# Patient Record
(demographics unavailable — no encounter records)

---

## 2025-04-19 NOTE — DISCUSSION/SUMMARY
[de-identified] : X-Ray Examination of the LUMBAR SPINE 2 views shows: disc space narrowing  Assessment:   The patient is a 48 year old male with physical exam findings consistent with lumbar radiculopathy and DDD  - We discussed their diagnosis and treatment options at length including the risks and benefits of both surgical and non-surgical options. - We will continue conservative treatment with anti-inflammatory medication. - Rx given for Medrol dose pack and Cyclobenzaprine - Discussed the possible side effects of medication along with the timing and frequency for taking. - Discussed IR to pain management   Follow Up: as needed with me

## 2025-04-19 NOTE — IMAGING
[de-identified] : Back / Spine: Inspection: No erythema and ecchymosis. Palpation: left lumbar paraspinal tenderness. Range of motion: Near full range of motion but with mild stiffness. Pain with lateral rotation. pain at extremes of flexion Strength Testing: Weakness with left ankle dorsiflexion and EHL strength Otherwise 5/5 throughout both lower extremities with normal tone Neurological testing: light touch is intact throughout both lower extremities Straight Leg Raise: positive left / right [place] test: neg

## 2025-04-19 NOTE — DATA REVIEWED
[MRI] : MRI [Lumbar Spine] : lumbar spine [Report was reviewed and noted in the chart] : The report was reviewed and noted in the chart [I independently reviewed and interpreted images and report] : I independently reviewed and interpreted images and report [I reviewed the films/CD] : I reviewed the films/CD [FreeTextEntry1] :  MRI 12/02/24 Lumbar Spine: 1. Grade 1 anterolisthesis of L4 on L5. 2. Multilevel degenerative spondylosis of lumbar spine combined with epidural lipomatosis contributing to varying degree of spinal canal stenosis. Moderate thecal sac narrowing at L1-L2 and L2-L3 levels, moderate to severe thecal sac narrowing L3-L4 level and severe thecal sac narrowing at L4-L5 level.

## 2025-04-19 NOTE — DISCUSSION/SUMMARY
[de-identified] : X-Ray Examination of the LUMBAR SPINE 2 views shows: disc space narrowing  Assessment:   The patient is a 48 year old male with physical exam findings consistent with lumbar radiculopathy and DDD  - We discussed their diagnosis and treatment options at length including the risks and benefits of both surgical and non-surgical options. - We will continue conservative treatment with anti-inflammatory medication. - Rx given for Medrol dose pack and Cyclobenzaprine - Discussed the possible side effects of medication along with the timing and frequency for taking. - Discussed IR to pain management   Follow Up: as needed with me

## 2025-04-19 NOTE — IMAGING
[de-identified] : Back / Spine: Inspection: No erythema and ecchymosis. Palpation: left lumbar paraspinal tenderness. Range of motion: Near full range of motion but with mild stiffness. Pain with lateral rotation. pain at extremes of flexion Strength Testing: Weakness with left ankle dorsiflexion and EHL strength Otherwise 5/5 throughout both lower extremities with normal tone Neurological testing: light touch is intact throughout both lower extremities Straight Leg Raise: positive left / right [place] test: neg

## 2025-04-19 NOTE — HISTORY OF PRESENT ILLNESS
[de-identified] : 04/16/2025: The patient is a 48 year old M, right hand dominant who presents today complaining of lower back pain. Date of Injury/Onset: Chronic, worsening since 04/12/2025 Pain:    At Rest: 5/10 With Activity:  10/10 Mechanism of injury: Tripped and caught himself, pain began after standing back up  This is [not] a Work Related Injury being treated under Worker's Compensation. This is [not] an athletic injury occurring associated with an interscholastic or organized sports team. Quality of symptoms: Sharp/achy left sided low back pain, n/t in tops of thighs  Improves with: Heat, back brace, work  Worse with: Quick movements, squatting, sleep  Prior treatment: Acupuncture, chiropractor, massage, spinal decompression  Prior imaging: x-ray, MRI at  2024  Previous injury: History of herniated discs, arthritis in back  Out of work/sport: N/A School/Sport/Position/Occupation: Contractor  Additional Information: [None]

## 2025-04-19 NOTE — HISTORY OF PRESENT ILLNESS
[de-identified] : 04/16/2025: The patient is a 48 year old M, right hand dominant who presents today complaining of lower back pain. Date of Injury/Onset: Chronic, worsening since 04/12/2025 Pain:    At Rest: 5/10 With Activity:  10/10 Mechanism of injury: Tripped and caught himself, pain began after standing back up  This is [not] a Work Related Injury being treated under Worker's Compensation. This is [not] an athletic injury occurring associated with an interscholastic or organized sports team. Quality of symptoms: Sharp/achy left sided low back pain, n/t in tops of thighs  Improves with: Heat, back brace, work  Worse with: Quick movements, squatting, sleep  Prior treatment: Acupuncture, chiropractor, massage, spinal decompression  Prior imaging: x-ray, MRI at  2024  Previous injury: History of herniated discs, arthritis in back  Out of work/sport: N/A School/Sport/Position/Occupation: Contractor  Additional Information: [None]

## 2025-04-25 NOTE — HISTORY OF PRESENT ILLNESS
[Lower back] : lower back [10] : 10 [5] : 5 [Sharp] : sharp [Shooting] : shooting [Stabbing] : stabbing [Tightness] : tightness [Constant] : constant [Leisure] : leisure [Work] : work [Sleep] : sleep [Meds] : meds [Nothing helps with pain getting better] : Nothing helps with pain getting better [Bending forward] : bending forward [Lying in bed] : lying in bed [FreeTextEntry1] : 48 year M presents for initial evaluation regarding their low back pain.  Pain started about 10 years ago, no inciting event. Has progressed recently.   Current treatment: Flexeril   Location: Bilateral lower back into the anterolateral thighs Numbness/tingling: yes  Weakness: no    Bowel/bladder dysfunction: Denies   Prior injections: Denies   Prior Treatments: Chiropractor, PT, NSAIDs, Tylenol, Acupuncture, Mobic   Pertinent Surgical History: Denies   Anticoagulation: Denies     Patient denies current infection, fevers, or chills. Physician Disclaimer: I have personally reviewed and confirmed all HPI data with the patient. [] : no [FreeTextEntry6] : pinching, numbness  [FreeTextEntry7] : b/l legs to the knee

## 2025-04-25 NOTE — PHYSICAL EXAM
[de-identified] : General: Appears well nourished and well developed, no acute distress Musculoskeletal: Gait is non-antalgic, patient is ambulating without assistance Lumbar Spine Exam:                       Inspection:    erythema (-)   ecchymosis (-)   rashes (-)                         Palpation: Palpation/percussion at the midline lumbar levels reveals no tenderness                      ROM:    ROM - full range of motion with mild stiffness                           Strength Testin/5 in the bilateral lower extremities                      Reflexes: 2/2 in the bilateral lower extremities                      Sensation: Sensation to light touch grossly intact in the bilateral lower extremities                      Special Tests:  SLR: R (-) ; L (-), Facet loading: R (-) ; L (-)

## 2025-04-25 NOTE — DATA REVIEWED
[FreeTextEntry1] :  MRI images personally reviewed and reviewed with patient. L/S MRI ZP 12/2024: Grade 1 anterolisthesis of L4 on L5. Multilevel degenerative spondylosis of lumbar spine combined with epidural lipomatosis contributing to varying degree of spinal canal stenosis. Moderate thecal sac narrowing at L1-L2 and L2-L3 levels, moderate to severe thecal sac narrowing L3-L4 level and severe thecal sac narrowing at L4-L5 level.

## 2025-04-25 NOTE — PHYSICAL EXAM
[de-identified] : General: Appears well nourished and well developed, no acute distress Musculoskeletal: Gait is non-antalgic, patient is ambulating without assistance Lumbar Spine Exam:                       Inspection:    erythema (-)   ecchymosis (-)   rashes (-)                         Palpation: Palpation/percussion at the midline lumbar levels reveals no tenderness                      ROM:    ROM - full range of motion with mild stiffness                           Strength Testin/5 in the bilateral lower extremities                      Reflexes: 2/2 in the bilateral lower extremities                      Sensation: Sensation to light touch grossly intact in the bilateral lower extremities                      Special Tests:  SLR: R (-) ; L (-), Facet loading: R (-) ; L (-)

## 2025-04-25 NOTE — ASSESSMENT
[FreeTextEntry1] : 48 year M presents with pain in the back and leg.  Lumbar stenosis/radiculitis, attempt caudal SHANIQUA.  A discussion regarding available pain management treatment options occurred with the patient.  These included interventional, rehabilitative, pharmacological, and alternative modalities. We will proceed with the following:   Interventional treatment options: - Proceed with Caudal SHANIQUA with fluoroscopic guidance - If inadequate relief would likely consider MBBs - see additional instructions below      Rehabilitative options: - participation in active HEP was discussed and instructions provided   Medication based treatment options: - None   Complementary treatment options: - Weight management and lifestyle modifications discussed    Additional treatment recommendations as follows: - patient to follow up with Dr. Faulkner - [facility] after SHANIQUA  Patient is presenting with acute/sub-acute pain with impairment in ADLs and functionality. The pain has not responded to at least 6 weeks of conservative care including NSAID therapy, OTC analgesics and/or physical therapy and/or home exercise program within the last 3 months.  The risks, benefits and alternatives of the proposed procedure were explained in detail with the patient. The risks outlined include but are not limited to infection, bleeding, post- dural puncture headache, nerve injury, a temporary increase in pain, failure to resolve symptoms, need for future interventions, allergic reaction, and possible elevation of blood sugar in diabetics if using corticosteroid.  All questions were answered to patient's apparent satisfaction, and he/she verbalized an understanding.  Discussed risks of steroids and worsening epidural lipomatosis, patient expressed understanding. Would like to proceed.

## 2025-04-25 NOTE — ASSESSMENT
[FreeTextEntry1] : 48 year M presents with pain in the back and leg.  Lumbar stenosis/radiculitis, attempt caudal SHANIQUA.  A discussion regarding available pain management treatment options occurred with the patient.  These included interventional, rehabilitative, pharmacological, and alternative modalities. We will proceed with the following:   Interventional treatment options: - Proceed with Caudal SHANIQUA with fluoroscopic guidance - If inadequate relief would likely consider MBBs - see additional instructions below      Rehabilitative options: - participation in active HEP was discussed and instructions provided   Medication based treatment options: - None   Complementary treatment options: - Weight management and lifestyle modifications discussed    Additional treatment recommendations as follows: - patient to follow up with Dr. Faulkner - [facility] after SHANIQUA  Patient is presenting with acute/sub-acute pain with impairment in ADLs and functionality. The pain has not responded to at least 6 weeks of conservative care including NSAID therapy, OTC analgesics and/or physical therapy and/or home exercise program within the last 3 months.  The risks, benefits and alternatives of the proposed procedure were explained in detail with the patient. The risks outlined include but are not limited to infection, bleeding, post- dural puncture headache, nerve injury, a temporary increase in pain, failure to resolve symptoms, need for future interventions, allergic reaction, and possible elevation of blood sugar in diabetics if using corticosteroid.  All questions were answered to patient's apparent satisfaction, and he/she verbalized an understanding.  Discussed risks of steroids and worsening epidural lipomatosis, patient expressed understanding. Would like to proceed.

## 2025-05-20 NOTE — PROCEDURE
[FreeTextEntry3] : Date of Service: 05/20/2025     Account: [id number]    Patient: FELICIA WINSLOW     YOB: 1976    Age: 48 year    Surgeon:     Omar Touer DO    Assistant:    None    Pre-Operative Diagnosis:         Lumbar radiculopathy    Post Operative Diagnosis:       Lumbar radiculopathy    Procedure:             Caudal epidural steroid injection with fluoroscopic guidance    Anesthesia: None                 Procedure Details: Patient was seen in the preoperative area and a detailed discussion about the risks, benefits and alternatives was carried out.  All related questions were satisfactorily answered and an informed consent was signed.  Procedure site was marked and patient was taken to the fluoroscopy suite and placed in the prone position on the procedure table.  A spine positioning device was utilized for adequate position.  Monitors were applied and vital signs were recorded.  Anesthesia was carried out as mentioned above.   Under lateral fluoroscopic views, the sacral canal was identified. The lower lumbar, gluteal and sacral area was prepped with Chloraprep in a circumferential manner and draped in sterile aseptic fashion. The skin overlying the sacral hiatus was anesthetized with 3-5 ml of 1% lidocaine using a 25 gauge needle.   Using intermittent fluoroscopic views, a 22-gauge spinal needle was inserted through the skin and advanced through the sacrococcygeal ligament to enter the epidural space. Aspiration was confirmed negative for heme or CSF. No paresthesia's were elicited. Epidural positioning of the needle was confirmed with AP and lateral fluoroscopic views. On the lateral view, 2-3 ml of Omnipaque 240 mgI/ml contrast agent was injected with confirmation under continuous cine-epidurogram. In A-P view dye spread was seen to spread cephalad into the epidural space up to L5-S1 interspace.   At this point under continuous monitoring and with intermittent negative aspiration approximate 8 ml of preservative free 0.9% normal saline with 2 ml of Methylprednisolone  40 mg/ml was injected in the epidural space. The needle was flushed and removed. Patient tolerated the procedure well.   There were no immediate complications from the performed procedure.  The patient was instructed to apply ice over the injection sites for twenty minutes every two hours for the next 24 hours.    Disposition:       1.  The patient was advised to F/U in 1-2 weeks to assess the response to the injection.       2.  The patient was also instructed to contact me immediately if there were any concerns related to the procedure performed         .